# Patient Record
Sex: MALE | Race: WHITE | Employment: UNEMPLOYED | URBAN - NONMETROPOLITAN AREA
[De-identification: names, ages, dates, MRNs, and addresses within clinical notes are randomized per-mention and may not be internally consistent; named-entity substitution may affect disease eponyms.]

---

## 2017-11-14 ENCOUNTER — HOSPITAL ENCOUNTER (INPATIENT)
Age: 57
LOS: 2 days | Discharge: HOME | DRG: 885 | End: 2017-11-17
Attending: EMERGENCY MEDICINE | Admitting: PSYCHIATRY & NEUROLOGY
Payer: COMMERCIAL

## 2017-11-14 DIAGNOSIS — R45.851 DEPRESSION WITH SUICIDAL IDEATION: Primary | ICD-10-CM

## 2017-11-14 DIAGNOSIS — R79.89 ELEVATED SERUM CREATININE: ICD-10-CM

## 2017-11-14 DIAGNOSIS — T50.902A INTENTIONAL DRUG OVERDOSE, INITIAL ENCOUNTER (HCC): ICD-10-CM

## 2017-11-14 DIAGNOSIS — F10.920 ACUTE ALCOHOLIC INTOXICATION WITHOUT COMPLICATION (HCC): ICD-10-CM

## 2017-11-14 DIAGNOSIS — F32.A DEPRESSION WITH SUICIDAL IDEATION: Primary | ICD-10-CM

## 2017-11-14 LAB
ACETAMINOPHEN LEVEL: <15 UG/ML
ALBUMIN SERPL-MCNC: 4.3 G/DL (ref 3.5–5.2)
ALP BLD-CCNC: 74 U/L (ref 40–130)
ALT SERPL-CCNC: 35 U/L (ref 5–41)
AMORPHOUS: ABNORMAL /HPF
AMPHETAMINE SCREEN, URINE: NEGATIVE
ANION GAP SERPL CALCULATED.3IONS-SCNC: 16 MMOL/L (ref 7–19)
AST SERPL-CCNC: 29 U/L (ref 5–40)
BACTERIA: ABNORMAL /HPF
BARBITURATE SCREEN URINE: NEGATIVE
BASOPHILS ABSOLUTE: 0 K/UL (ref 0–0.2)
BASOPHILS RELATIVE PERCENT: 0.4 % (ref 0–1)
BENZODIAZEPINE SCREEN, URINE: NEGATIVE
BILIRUB SERPL-MCNC: <0.2 MG/DL (ref 0.2–1.2)
BILIRUBIN URINE: NEGATIVE
BLOOD, URINE: ABNORMAL
BUN BLDV-MCNC: 24 MG/DL (ref 6–20)
CALCIUM SERPL-MCNC: 8.7 MG/DL (ref 8.6–10)
CANNABINOID SCREEN URINE: NEGATIVE
CASTS: ABNORMAL /LPF
CHLORIDE BLD-SCNC: 102 MMOL/L (ref 98–111)
CLARITY: ABNORMAL
CO2: 23 MMOL/L (ref 22–29)
COCAINE METABOLITE SCREEN URINE: NEGATIVE
COLOR: YELLOW
CREAT SERPL-MCNC: 1.5 MG/DL (ref 0.5–1.2)
EOSINOPHILS ABSOLUTE: 0 K/UL (ref 0–0.6)
EOSINOPHILS RELATIVE PERCENT: 0.3 % (ref 0–5)
EPITHELIAL CELLS, UA: ABNORMAL /HPF
ETHANOL: 156 MG/DL (ref 0–0.08)
GFR NON-AFRICAN AMERICAN: 48
GLUCOSE BLD-MCNC: 104 MG/DL (ref 74–109)
GLUCOSE URINE: NEGATIVE MG/DL
HCT VFR BLD CALC: 42.5 % (ref 42–52)
HEMOGLOBIN: 14.2 G/DL (ref 14–18)
INR BLD: 1.08 (ref 0.88–1.18)
KETONES, URINE: NEGATIVE MG/DL
LEUKOCYTE ESTERASE, URINE: NEGATIVE
LYMPHOCYTES ABSOLUTE: 1.5 K/UL (ref 1.1–4.5)
LYMPHOCYTES RELATIVE PERCENT: 19.1 % (ref 20–40)
Lab: NORMAL
MCH RBC QN AUTO: 30.3 PG (ref 27–31)
MCHC RBC AUTO-ENTMCNC: 33.4 G/DL (ref 33–37)
MCV RBC AUTO: 90.6 FL (ref 80–94)
MONOCYTES ABSOLUTE: 0.5 K/UL (ref 0–0.9)
MONOCYTES RELATIVE PERCENT: 6.7 % (ref 0–10)
NEUTROPHILS ABSOLUTE: 5.8 K/UL (ref 1.5–7.5)
NEUTROPHILS RELATIVE PERCENT: 73 % (ref 50–65)
NITRITE, URINE: NEGATIVE
OPIATE SCREEN URINE: NEGATIVE
PDW BLD-RTO: 11.9 % (ref 11.5–14.5)
PH UA: 6
PLATELET # BLD: 260 K/UL (ref 130–400)
PMV BLD AUTO: 8.9 FL (ref 9.4–12.4)
POTASSIUM SERPL-SCNC: 3.9 MMOL/L (ref 3.5–5)
PROTEIN UA: 100 MG/DL
PROTHROMBIN TIME: 13.9 SEC (ref 12–14.6)
RBC # BLD: 4.69 M/UL (ref 4.7–6.1)
SALICYLATE, SERUM: <3 MG/DL (ref 3–10)
SODIUM BLD-SCNC: 141 MMOL/L (ref 136–145)
SPECIFIC GRAVITY UA: 1.02
TOTAL PROTEIN: 7.4 G/DL (ref 6.6–8.7)
UROBILINOGEN, URINE: 0.2 E.U./DL
WBC # BLD: 7.9 K/UL (ref 4.8–10.8)
WBC UA: ABNORMAL /HPF (ref 0–5)

## 2017-11-14 PROCEDURE — 80053 COMPREHEN METABOLIC PANEL: CPT

## 2017-11-14 PROCEDURE — 85025 COMPLETE CBC W/AUTO DIFF WBC: CPT

## 2017-11-14 PROCEDURE — G0480 DRUG TEST DEF 1-7 CLASSES: HCPCS

## 2017-11-14 PROCEDURE — 36415 COLL VENOUS BLD VENIPUNCTURE: CPT

## 2017-11-14 PROCEDURE — 99285 EMERGENCY DEPT VISIT HI MDM: CPT

## 2017-11-14 PROCEDURE — 81025 URINE PREGNANCY TEST: CPT

## 2017-11-14 PROCEDURE — 93005 ELECTROCARDIOGRAM TRACING: CPT

## 2017-11-14 PROCEDURE — 80307 DRUG TEST PRSMV CHEM ANLYZR: CPT

## 2017-11-14 PROCEDURE — 85610 PROTHROMBIN TIME: CPT

## 2017-11-14 PROCEDURE — 81003 URINALYSIS AUTO W/O SCOPE: CPT

## 2017-11-14 PROCEDURE — 2580000003 HC RX 258: Performed by: EMERGENCY MEDICINE

## 2017-11-14 RX ORDER — BUPROPION HYDROCHLORIDE 150 MG/1
150 TABLET ORAL EVERY MORNING
COMMUNITY

## 2017-11-14 RX ORDER — NICOTINE 21 MG/24HR
1 PATCH, TRANSDERMAL 24 HOURS TRANSDERMAL DAILY
Status: DISCONTINUED | OUTPATIENT
Start: 2017-11-15 | End: 2017-11-15

## 2017-11-14 RX ORDER — SIMVASTATIN 40 MG
40 TABLET ORAL NIGHTLY
COMMUNITY

## 2017-11-14 RX ORDER — GEMFIBROZIL 600 MG/1
600 TABLET, FILM COATED ORAL 2 TIMES DAILY
COMMUNITY

## 2017-11-14 RX ORDER — AMLODIPINE BESYLATE AND BENAZEPRIL HYDROCHLORIDE 5; 10 MG/1; MG/1
1 CAPSULE ORAL DAILY
COMMUNITY

## 2017-11-14 RX ORDER — FLUOXETINE HYDROCHLORIDE 20 MG/1
40 CAPSULE ORAL DAILY
Status: ON HOLD | COMMUNITY
End: 2017-11-17 | Stop reason: HOSPADM

## 2017-11-14 RX ORDER — SODIUM CHLORIDE 9 MG/ML
INJECTION, SOLUTION INTRAVENOUS CONTINUOUS
Status: DISCONTINUED | OUTPATIENT
Start: 2017-11-14 | End: 2017-11-15

## 2017-11-14 RX ADMIN — SODIUM CHLORIDE: 9 INJECTION, SOLUTION INTRAVENOUS at 19:06

## 2017-11-14 ASSESSMENT — ENCOUNTER SYMPTOMS
ABDOMINAL PAIN: 0
SHORTNESS OF BREATH: 0
COUGH: 0
NAUSEA: 0
WHEEZING: 0
CONSTIPATION: 0
BLOOD IN STOOL: 0
CHEST TIGHTNESS: 0
VOMITING: 0
EYES NEGATIVE: 1
DIARRHEA: 0
RECTAL PAIN: 0

## 2017-11-15 PROBLEM — F32.A DEPRESSION WITH SUICIDAL IDEATION: Status: ACTIVE | Noted: 2017-11-15

## 2017-11-15 PROBLEM — N28.9 ACUTE RENAL IMPAIRMENT: Status: ACTIVE | Noted: 2017-11-15

## 2017-11-15 PROBLEM — F32.A DEPRESSION WITH SUICIDAL IDEATION: Status: RESOLVED | Noted: 2017-11-15 | Resolved: 2017-11-15

## 2017-11-15 PROBLEM — F17.223: Status: ACTIVE | Noted: 2017-11-15

## 2017-11-15 PROBLEM — F10.10 ALCOHOL ABUSE: Status: ACTIVE | Noted: 2017-11-15
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
## 2017-11-15 PROBLEM — F43.20 ANACLITIC DEPRESSION: Status: ACTIVE | Noted: 2017-11-15

## 2017-11-15 PROBLEM — F33.2 SEVERE EPISODE OF RECURRENT MAJOR DEPRESSIVE DISORDER, WITHOUT PSYCHOTIC FEATURES (HCC): Status: ACTIVE | Noted: 2017-11-15

## 2017-11-15 PROBLEM — I10 ESSENTIAL HYPERTENSION: Status: ACTIVE | Noted: 2017-11-15

## 2017-11-15 PROBLEM — R45.851 DEPRESSION WITH SUICIDAL IDEATION: Status: RESOLVED | Noted: 2017-11-15 | Resolved: 2017-11-15

## 2017-11-15 PROBLEM — E78.5 HYPERLIPIDEMIA: Status: ACTIVE | Noted: 2017-11-15

## 2017-11-15 PROBLEM — G47.8 SLEEP AROUSAL DISORDER: Status: ACTIVE | Noted: 2017-11-15

## 2017-11-15 PROBLEM — R45.851 DEPRESSION WITH SUICIDAL IDEATION: Status: ACTIVE | Noted: 2017-11-15

## 2017-11-15 LAB
EKG P AXIS: 59 DEGREES
EKG P-R INTERVAL: 212 MS
EKG Q-T INTERVAL: 396 MS
EKG QRS DURATION: 110 MS
EKG QTC CALCULATION (BAZETT): 443 MS
EKG T AXIS: 66 DEGREES
ETHANOL: 19 MG/DL (ref 0–0.08)

## 2017-11-15 PROCEDURE — 99284 EMERGENCY DEPT VISIT MOD MDM: CPT | Performed by: EMERGENCY MEDICINE

## 2017-11-15 PROCEDURE — 90674 CCIIV4 VAC NO PRSV 0.5 ML IM: CPT | Performed by: FAMILY MEDICINE

## 2017-11-15 PROCEDURE — G0008 ADMIN INFLUENZA VIRUS VAC: HCPCS | Performed by: FAMILY MEDICINE

## 2017-11-15 PROCEDURE — 1240000000 HC EMOTIONAL WELLNESS R&B

## 2017-11-15 PROCEDURE — 90792 PSYCH DIAG EVAL W/MED SRVCS: CPT | Performed by: PSYCHIATRY & NEUROLOGY

## 2017-11-15 PROCEDURE — 6370000000 HC RX 637 (ALT 250 FOR IP): Performed by: PSYCHIATRY & NEUROLOGY

## 2017-11-15 PROCEDURE — 6370000000 HC RX 637 (ALT 250 FOR IP): Performed by: EMERGENCY MEDICINE

## 2017-11-15 PROCEDURE — G0480 DRUG TEST DEF 1-7 CLASSES: HCPCS

## 2017-11-15 PROCEDURE — 6360000002 HC RX W HCPCS: Performed by: FAMILY MEDICINE

## 2017-11-15 PROCEDURE — 36415 COLL VENOUS BLD VENIPUNCTURE: CPT

## 2017-11-15 RX ORDER — BUPROPION HYDROCHLORIDE 150 MG/1
150 TABLET ORAL EVERY MORNING
Status: DISCONTINUED | OUTPATIENT
Start: 2017-11-15 | End: 2017-11-17 | Stop reason: HOSPADM

## 2017-11-15 RX ORDER — NICOTINE 21 MG/24HR
1 PATCH, TRANSDERMAL 24 HOURS TRANSDERMAL DAILY
Status: DISCONTINUED | OUTPATIENT
Start: 2017-11-15 | End: 2017-11-17 | Stop reason: HOSPADM

## 2017-11-15 RX ORDER — AMLODIPINE BESYLATE AND BENAZEPRIL HYDROCHLORIDE 5; 10 MG/1; MG/1
1 CAPSULE ORAL DAILY
Status: DISCONTINUED | OUTPATIENT
Start: 2017-11-15 | End: 2017-11-15

## 2017-11-15 RX ORDER — AMLODIPINE BESYLATE 5 MG/1
5 TABLET ORAL DAILY
Status: DISCONTINUED | OUTPATIENT
Start: 2017-11-15 | End: 2017-11-17 | Stop reason: HOSPADM

## 2017-11-15 RX ORDER — NALTREXONE HYDROCHLORIDE 50 MG/1
25 TABLET, FILM COATED ORAL
Status: DISCONTINUED | OUTPATIENT
Start: 2017-11-16 | End: 2017-11-15

## 2017-11-15 RX ORDER — TRAZODONE HYDROCHLORIDE 50 MG/1
50 TABLET ORAL NIGHTLY
Status: DISCONTINUED | OUTPATIENT
Start: 2017-11-15 | End: 2017-11-17 | Stop reason: HOSPADM

## 2017-11-15 RX ORDER — GEMFIBROZIL 600 MG/1
600 TABLET, FILM COATED ORAL 2 TIMES DAILY
Status: DISCONTINUED | OUTPATIENT
Start: 2017-11-15 | End: 2017-11-17 | Stop reason: HOSPADM

## 2017-11-15 RX ORDER — SIMVASTATIN 40 MG
40 TABLET ORAL NIGHTLY
Status: DISCONTINUED | OUTPATIENT
Start: 2017-11-15 | End: 2017-11-17 | Stop reason: HOSPADM

## 2017-11-15 RX ORDER — NICOTINE 21 MG/24HR
1 PATCH, TRANSDERMAL 24 HOURS TRANSDERMAL ONCE
Status: COMPLETED | OUTPATIENT
Start: 2017-11-15 | End: 2017-11-16

## 2017-11-15 RX ORDER — LISINOPRIL 10 MG/1
10 TABLET ORAL DAILY
Status: DISCONTINUED | OUTPATIENT
Start: 2017-11-15 | End: 2017-11-17 | Stop reason: HOSPADM

## 2017-11-15 RX ORDER — ACETAMINOPHEN 325 MG/1
650 TABLET ORAL EVERY 4 HOURS PRN
Status: DISCONTINUED | OUTPATIENT
Start: 2017-11-15 | End: 2017-11-17 | Stop reason: HOSPADM

## 2017-11-15 RX ADMIN — A/SINGAPORE/GP1908/2015 IVR-180 (H1N1) (AN A/MICHIGAN/45/2015-LIKE VIRUS), A/SINGAPORE/GP2050/2015 (H3N2) (AN A/HONG KONG/4801/2014 - LIKE VIRUS), B/UTAH/9/2014 (A B/PHUKET/3073/2013-LIKE VIRUS), B/HONG KONG/259/2010 (A B/BRISBANE/60/08-LIKE VIRUS) 0.5 ML: 15; 15; 15; 15 INJECTION, SUSPENSION INTRAMUSCULAR at 20:24

## 2017-11-15 RX ADMIN — ACETAMINOPHEN 650 MG: 325 TABLET, FILM COATED ORAL at 10:25

## 2017-11-15 RX ADMIN — TRAZODONE HYDROCHLORIDE 50 MG: 50 TABLET ORAL at 20:24

## 2017-11-15 RX ADMIN — LISINOPRIL 10 MG: 10 TABLET ORAL at 13:04

## 2017-11-15 RX ADMIN — SIMVASTATIN 40 MG: 40 TABLET, FILM COATED ORAL at 20:24

## 2017-11-15 RX ADMIN — GEMFIBROZIL 600 MG: 600 TABLET ORAL at 13:04

## 2017-11-15 RX ADMIN — AMLODIPINE BESYLATE 5 MG: 5 TABLET ORAL at 13:04

## 2017-11-15 RX ADMIN — BUPROPION HYDROCHLORIDE 150 MG: 150 TABLET, FILM COATED, EXTENDED RELEASE ORAL at 13:04

## 2017-11-15 RX ADMIN — GEMFIBROZIL 600 MG: 600 TABLET ORAL at 20:24

## 2017-11-15 RX ADMIN — ACETAMINOPHEN 650 MG: 325 TABLET, FILM COATED ORAL at 20:24

## 2017-11-15 ASSESSMENT — SLEEP AND FATIGUE QUESTIONNAIRES
DIFFICULTY STAYING ASLEEP: YES
SLEEP PATTERN: DIFFICULTY FALLING ASLEEP;DISTURBED/INTERRUPTED SLEEP
RESTFUL SLEEP: NO
AVERAGE NUMBER OF SLEEP HOURS: 7
DIFFICULTY FALLING ASLEEP: YES
DO YOU HAVE DIFFICULTY SLEEPING: YES
DO YOU USE A SLEEP AID: NO
DIFFICULTY ARISING: YES

## 2017-11-15 ASSESSMENT — LIFESTYLE VARIABLES: HISTORY_ALCOHOL_USE: YES

## 2017-11-15 ASSESSMENT — PAIN SCALES - GENERAL
PAINLEVEL_OUTOF10: 3
PAINLEVEL_OUTOF10: 6

## 2017-11-15 ASSESSMENT — PATIENT HEALTH QUESTIONNAIRE - PHQ9: SUM OF ALL RESPONSES TO PHQ QUESTIONS 1-9: 6

## 2017-11-15 NOTE — ED NOTES
ASSESSMENT:    PT ALERT/ORIENTED X4. PUPILS EQUAL/REACTIVE    SKIN:  WARM/DRY PINK CAPILLARY REFILL < 2SECS    CARDIAC:  S1 S2 NOTED     LUNGS: CLEAR UPPER AND LOWER LOBES, RESPIRATIONS EVEN/UNLABORED     ABDOMEN: BOWEL SOUNDS NOTED UPPER AND LOWER QUADRANTS                     SOFT AND NONTENDER. EXTREMITIES:  BILATERAL DP AND PT AND NO EDEMA NOTED. NO DISTRESS NOTED. SIDE RAILS UP AND CALL LIGHT IN REACH.      Cristian Burleson RN  11/14/17 3542

## 2017-11-15 NOTE — PROGRESS NOTES
CSW attempted to obtain collateral and discuss family meeting date/times, as discussed with Dr. Estrella Monterroso, with pt's wife at the number provided [phone]. No answer, and no option to leave a message. CSW to attempt again at later time.      Electronically signed by Sheila Rubinstein, [address] on 11/15/2017 at 12:17 PM

## 2017-11-15 NOTE — BH NOTE
BHI Daily Shift Assessment  Nursing Progress Note    Room: [facility]615-01 Name: Vincenzo Flores Age: 65 y.o. Gender: male   Dx: <principal problem not specified>  Precautions: suicide risk and fall risk  Has POA: Yes    Target Symptoms:    Accu-Chek: No  Sleep: Yes,Sleep Quality Good   SI no plan AVH denied HI Negative for homicidal ideation     Depression: 5 Anxiety: 5    Med Compliant: Yes   PRN Meds: No    Appetite: good Percent Meals: 75%   Social: No ADLs: Yes Speech: normal   Shower this Shift: No Groomed: Yes Dressed appropriately for day:  Yes  Attends Group Therapy: Yes  Participation LevelActive Listener  Participation QualityResistant    Complaints:      Notes:       Signature: Luis Manuel Dillard LPN

## 2017-11-15 NOTE — ED PROVIDER NOTES
[facility] [address]  eMERGENCY dEPARTMENT eNCOUnter      Pt Name: Donn De La Torre  MRN: [id number]  [place] 1960  Date of evaluation: 11/14/2017  Provider: JOSE Palm    CHIEF COMPLAINT       Chief Complaint   Patient presents with    Drug Overdose     40-50 OTC sleep aids, 10 percocet 10mg, possibly lisinopril, ETOH 500 ML whiskey. 10am today. Pt states it was a suicide attempt         HISTORY OF PRESENT ILLNESS  (Location/Symptom, Timing/Onset, Context/Setting, Quality, Duration, Modifying Factors, Severity.)   Donn De La Torre is a 65 y.o. male who presents to the emergency department with reports of an intentional overdose. Onset 1000 this morning. Patient reluctantly reports that he took 10-Percocet 10.5 mg pills today, approximately 100- \"over the counter generic sleep aids because they were cheaper\" and drank an undetermined amount of Malagasy mist. The patient stated \"I just figured my wife and family would be better off financially if I wasn't around anymore\". Pill count by nursing staff relays that there is 100 pills in the Rexall 25 mg diphenhydramine bottle and 25 pills were missing. HPI    Nursing Notes were reviewed and I agree. REVIEW OF SYSTEMS    (2-9 systems for level 4, 10 or more for level 5)     Review of Systems   Constitutional: Negative for chills, fatigue and fever. HENT: Negative. Eyes: Negative. Respiratory: Negative for cough, chest tightness, shortness of breath and wheezing. Cardiovascular: Negative for chest pain and palpitations. Gastrointestinal: Negative for abdominal pain, blood in stool, constipation, diarrhea, nausea, rectal pain and vomiting. Genitourinary: Negative. Musculoskeletal: Negative. Skin: Negative. Neurological: Negative. Psychiatric/Behavioral: Positive for self-injury. Intentional overdose. All other systems reviewed and are negative.       PAST MEDICAL HISTORY     Past Medical History:   Diagnosis Date    Depression     Diverticulitis     Hyperlipidemia     Hypertension          SURGICAL HISTORY       Past Surgical History:   Procedure Laterality Date    COLECTOMY           CURRENT MEDICATIONS       Current Discharge Medication List      CONTINUE these medications which have NOT CHANGED    Details   FLUoxetine (PROZAC) 20 MG capsule Take 40 mg by mouth daily      amLODIPine-benazepril (LOTREL) 5-10 MG per capsule Take 1 capsule by mouth daily      gemfibrozil (LOPID) 600 MG tablet Take 600 mg by mouth 2 times daily      buPROPion (WELLBUTRIN XL) 150 MG extended release tablet Take 150 mg by mouth every morning      simvastatin (ZOCOR) 40 MG tablet Take 40 mg by mouth nightly             ALLERGIES     Review of patient's allergies indicates no known allergies. FAMILY HISTORY     History reviewed. No pertinent family history. SOCIAL HISTORY       Social History     Social History    Marital status:      Spouse name: N/A    Number of children: N/A    Years of education: N/A     Social History Main Topics    Smoking status: Never Smoker    Smokeless tobacco: Current User     Types: Chew    Alcohol use 1.8 oz/week     3 Cans of beer per week    Drug use: No    Sexual activity: Not Asked     Other Topics Concern    None     Social History Narrative    None       SCREENINGS    [place] Coma Scale  Eye Opening: Spontaneous  Best Verbal Response: Oriented  Best Motor Response: Obeys commands  [place] Coma Scale Score: 15      PHYSICAL EXAM    (up to 7 for level 4, 8 or more for level 5)     ED Triage Vitals   BP Temp Temp Source Pulse Resp SpO2 Height Weight   11/14/17 1927 11/14/17 1852 11/14/17 1852 11/14/17 1927 11/14/17 1927 11/14/17 1927 11/14/17 1852 11/14/17 1852   108/71 97.5 °F (36.4 °C) Temporal 88 16 93 % 5' 9\" (1.753 m) 205 lb (93 kg)       Physical Exam   Constitutional: He is oriented to person, place, and time.  He appears well-developed and that the patient had actually taken 6 tablets of his Lotrel this morning also. Poison control was notified recommendations standard is the same. Dr. Art Mcdonough also updated. Emergency Room Treatment Plan:  The patient was evaluated. Case discussed with Dr. Art Mcdonough. IV established. Blood and urine specimen obtained and sent to lab for analysis. IV fluids initiated due to hypotension. EKG will be performed. Patient will be visually monitored. Patient will be placed on telemetry and pulse ox monitoring. Patient will be monitored until his EtOH level is 70 or below so that he can be evaluated by behavioral health. Dr. Art Mcdonough will be continuing care at this point. Procedures      FINAL IMPRESSION      1. Depression with suicidal ideation    2. Intentional drug overdose, initial encounter ([facility] [address]    3. Acute alcoholic intoxication without complication (HCC)    4. Elevated serum creatinine          DISPOSITION/PLAN   DISPOSITION     PATIENT REFERRED TO:  No follow-up provider specified.     DISCHARGE MEDICATIONS:  Current Discharge Medication List          (Please note that portions of this note were completed with a voice recognition program.  Efforts were made to edit the dictations but occasionally words are mis-transcribed.)    Gennaro Ganser, [facility], APRN  11/15/17 6337

## 2017-11-15 NOTE — ED PROVIDER NOTES
Attending Supervisory Note/Shared Visit   I have personally performed a face to face diagnostic evaluation on this patient. I have reviewed the mid-levels findings and agree. Briefly, patient is a 32-year-old male who lost his job yesterday because he failed a drug test. Very depressed about this and overdosed at 9 AM this morning. Took 10 Percocet, 40-50 over-the-counter Benadryl, drank alcohol and took 6 of his blood pressure pills. He said he feels like his family would be better off financially if he was not around. Patient was intoxicated and slightly drowsy on arrival. Case was discussed with poison control. Patient was observed in the department until he was sober. His symptoms have resolved and is back to baseline now. His blood pressure was a little bit low but he was given a fluid bolus and his blood pressure has remained stable for several hours. Creatinine is slightly elevated. Notified patient and his wife about this. Told him that he should follow up with his primary doctor after he is discharged for recheck. Case discussed with on-call psychiatrist, Dr. Darell Nyhan, who is agreeable with plan of care and admission. FINAL IMPRESSION      1. Depression with suicidal ideation    2. Intentional drug overdose, initial encounter ([facility] [address]    3.  Acute alcoholic intoxication without complication (HCC)    4. Elevated serum creatinine          Maria L Gonzalez MD  Attending Emergency Physician      Maria L Gonzalez MD  11/15/17 5846

## 2017-11-15 NOTE — ED NOTES
Patient placed on cardiac monitor, continuous pulse oximeter, and NIBP monitor. Monitor alarms on.    [address], RN  11/14/17 6575

## 2017-11-15 NOTE — ED NOTES
Bed: 09  Expected date: 11/14/17  Expected time:   Means of arrival: [facility]  Comments:  Sabina Cruz RN  11/14/17 8806

## 2017-11-15 NOTE — ED TRIAGE NOTES
Pt arrived by EMS for intentional overdose on 40-50 OTC sleep aids (diphenhydramine hydrochloride) and 10 x 10mg Percocet. Pt states he may have taken lisinopril too.   Pt states he wanted to kill himself because he \"lost his job yesterday\"

## 2017-11-15 NOTE — H&P
Initial Psychiatric Evaluation    I. Patient Name:  Kellen Hernandes       YOB: 1960  Med Rec No:  [id number]  Account No:  [de-identified]  Physician:  Marce Lyn  Admission Date:  11/14/2017  6:51 PM  I.D.  66 yo male  Referral: Dylon Anna in after wife called ambulance  Legal Status: Voluntary    II. Chief Complaint   Patient presents with    Drug Overdose     40-50 OTC sleep aids, 10 percocet 10mg, possibly lisinopril, ETOH 500 ML whiskey. 10am today. Pt states it was a suicide attempt     \"Well, I wasn't too responsive. I lost my job and then I started drinking. Got this bright idea that I would just take these pills and she would be OK with the money. \"  States he never felt suicidal before but had been drinking about a pint. Took OTC sleep aids and 10 hydrocodone. III.  HPI:  Mood:  depressed, irritable and sad  Vegetative Symptoms:  Sleep up frequently at night, Energy Tired/Fatigued, Appetite Normal  Thinking:  normal  Substance Use:   reports that he has never smoked. His smokeless tobacco use includes Chew. He reports that he drinks about 1.8 oz of alcohol per week . He reports that he does not use drugs. Never any illicit substance use. And has had opioids only for surgeries and none recently. No steroids. SI/HI/Aggression:  No/No/no  Current Treatment:  PCP first put him on Toprol and then on Prozac for 8-10 yrs and stopped working really after first year. Wellbutrin helped and has been on it for past year. No other medication trials. Denies hx of agustina. Loves fall and winter and hates summer. Previous Treatment: No hospitalizations. No previous suicide attempts    IV. PMH:    No primary care provider on file.   Allergies as of 11/14/2017    (No Known Allergies)     Past Medical History:   Diagnosis Date    Depression     Diverticulitis     Hyperlipidemia     Hypertension      Past Surgical History:   Procedure Laterality Date    COLECTOMY             Current Facility-Administered Medications:     nicotine (NICODERM CQ) 21 MG/24HR 1 patch, 1 patch, Transdermal, Once, Clementine Moss MD, 1 patch at 11/15/17 0023    [START ON 2017] influenza quadrivalent subunit vaccine (FLUCELVAX) injection 0.5 mL, 0.5 mL, Intramuscular, Once, Rivera Alvares MD    acetaminophen (TYLENOL) tablet 650 mg, 650 mg, Oral, Q4H PRN, Rivera Alvaers MD    nicotine (NICODERM CQ) 21 MG/24HR 1 patch, 1 patch, Transdermal, Daily, Rivera Alvares MD, 1 patch at 11/15/17 0837  ROS: disruption of sleep, depression and excessive alcohol consumption    V. Social/Family History   reports that he has never smoked. His smokeless tobacco use includes Chew. He reports that he drinks about 1.8 oz of alcohol per week . He reports that he does not use drugs. History reviewed. No pertinent family history. Let go at job after 30 years because they were cutting back and had no probs on job. Work[place] in [employer].  Father  of cerebral hemorrhage at 40 when patient was 6. Only child and had stepfather who was good to patient and  25 mons ago of cancer. Close to mother. Honor roll and 3rd in class.  34 years and good and 3 children, daughter 34 and twins 32. No legal ever. Raised [employer] but not as active since children grown. [place] and fish. Twin daughter is autistic, but is doing well and living with maternal grandmother. No other family hx of psych. Substance use: no serious alcohol or drug issues. VI. Mental Status:  LOC Alert, Oriented x4 and Attentive, Grooming disheveled and sick looking, Cooperation full, Motor slightly restless but no involuntary movements Gait Steady Mood negative toward self \"I was stupid. \" speech fluent. Thoughts adequately organized and not bizarre and some insight. No overt hallucinations or delusions. Vocabulary and fund of info indicate at least average intellect. No evidence of psychosis or organicity. Oriented fully.  Memory good in all spheres as compared with known patient historical data. Mild evidence of ADHD but not usually impulsive and judgement has not typically been dangerous in past.    VII. 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 Treatment Plan:  1.  UR Justification  Very serious overdose with suicidal intent superimposed on long-standing depression and possible alcohol use disorder and sleep disorder  2. Risk Management/Collateral  Agrees to conference with wife  3. Medication  Continue Wellbutrin and add Trazodone. Have offered campral and recommended alcohol cessation. 4.  Other Therapies Marital conference and possibly involve children  5. [address] [place] [address]  Internal Medicine for initial physical and daily medical monitoring  6.   Referrals  Vocational Rehabilitation and possible sleep study      Electronically signed by Ila Bishop MD on 11/15/17 at 9:34 AM

## 2017-11-15 NOTE — PROGRESS NOTES
Psychosocial and CSSR-S completed on this date. Pt long and short term goals discussed. Pt voiced understanding. Treatment sheet signed. It was identified that pt will require outpatient follow up appointments at local community behavioral health facility such as[address]. Pt validated need for appointments.      In the last 6 months has the pt been danger to self: YES  In the last 6 months has the pt been danger to others: NO     Provided pt with [employer] handout entitled \"Quitting Smoking,\" reviewed handout with pt addressing dangers of smoking, developing coping skills, and providing basic information about quitting.   Pt declined practical counseling of tobacco practical counseling during the hospital stay

## 2017-11-15 NOTE — ED NOTES
Report received and care transferred from [facility]  11/14/17 6301

## 2017-11-15 NOTE — PROGRESS NOTES
[facility]  Admission Note     Admission Type:   Admission Type: Voluntary    Reason for admission:  Reason for Admission: Suicide attempt    PATIENT STRENGTHS:  Strengths: Communication, No significant Physical Illness, Positive Support    Patient Strengths and Limitations:  Strengths: Independent in basic self-care activities, Demonstrates basic social skills, Positive support network  Limitations: Inappropriate/potentially harmful leisure interests, Hopeless about future    Addictive Behavior:   Addictive Behavior  Do you have a history of Chemical Use?: No  Do you have a history of Alcohol Use?: Yes  Do you have a history of Street Drug Abuse?: No  Histroy of Prescripton Drug Abuse?: No    Medical Problems:   Past Medical History:   Diagnosis Date    Depression     Diverticulitis     Hyperlipidemia     Hypertension        Status EXAM:  Status and Exam  Normal: Yes  Facial Expression: Avoids Gaze  Affect: Normal  Level of Consciousness: Alert  Mood:Normal: No  Mood: Anxious, Irritable  Motor Activity:Normal: Yes  Interview Behavior: Cooperative  Preception: [place] to Person, [place] to Time, [place] to Place, [place] to Situation  Attention:Normal: Yes  Thought Processes: Circumstantial  Thought Content:Normal: Yes  Hallucinations: None  Delusions: No  Memory:Normal: Yes  Insight and Judgment: No  Insight and Judgment: Poor Judgment, Poor Insight  Present Suicidal Ideation: No  Present Homicidal Ideation: No    Pt admitted with followings belongings:  Dentures: None  Vision - Corrective Lenses: Glasses  Hearing Aid: None  Jewelry: None  Body Piercings Removed: No  Clothing: None  Were All Patient Medications Collected?: Not Applicable     Pt arrived to unit via wheelchair accompanied by security and [employer] tech. P searched for contraband by RN X 2.  Valuables sent home with wife. Patient oriented to surroundings and program expectations and copy of patient rights given.  Received admission

## 2017-11-15 NOTE — ED NOTES
Contacted Poison Control, spoke with Julee Harmon. Chapin Desir states therapeutic measures, check tylenol and ASA level, check urine. Could notice some agitation, urinary retention, resp depression.       Kaylan Griffith RN  11/14/17 1910

## 2017-11-16 LAB
ANION GAP SERPL CALCULATED.3IONS-SCNC: 14 MMOL/L (ref 7–19)
BUN BLDV-MCNC: 14 MG/DL (ref 6–20)
CALCIUM SERPL-MCNC: 9.5 MG/DL (ref 8.6–10)
CHLORIDE BLD-SCNC: 103 MMOL/L (ref 98–111)
CO2: 25 MMOL/L (ref 22–29)
CREAT SERPL-MCNC: 0.7 MG/DL (ref 0.5–1.2)
GFR NON-AFRICAN AMERICAN: >60
GLUCOSE BLD-MCNC: 106 MG/DL (ref 74–109)
POTASSIUM SERPL-SCNC: 4 MMOL/L (ref 3.5–5)
SODIUM BLD-SCNC: 142 MMOL/L (ref 136–145)
TSH SERPL DL<=0.05 MIU/L-ACNC: 1.68 UIU/ML (ref 0.27–4.2)
VITAMIN B-12: 380 PG/ML (ref 211–946)
VITAMIN D 25-HYDROXY: 23.8 NG/ML

## 2017-11-16 PROCEDURE — 84443 ASSAY THYROID STIM HORMONE: CPT

## 2017-11-16 PROCEDURE — 82607 VITAMIN B-12: CPT

## 2017-11-16 PROCEDURE — 82306 VITAMIN D 25 HYDROXY: CPT

## 2017-11-16 PROCEDURE — 6370000000 HC RX 637 (ALT 250 FOR IP): Performed by: FAMILY MEDICINE

## 2017-11-16 PROCEDURE — 6370000000 HC RX 637 (ALT 250 FOR IP): Performed by: PSYCHIATRY & NEUROLOGY

## 2017-11-16 PROCEDURE — 80048 BASIC METABOLIC PNL TOTAL CA: CPT

## 2017-11-16 PROCEDURE — 1240000000 HC EMOTIONAL WELLNESS R&B

## 2017-11-16 PROCEDURE — 36415 COLL VENOUS BLD VENIPUNCTURE: CPT

## 2017-11-16 RX ORDER — CHOLECALCIFEROL (VITAMIN D3) 125 MCG
500 CAPSULE ORAL DAILY
Status: DISCONTINUED | OUTPATIENT
Start: 2017-11-16 | End: 2017-11-17 | Stop reason: HOSPADM

## 2017-11-16 RX ORDER — ERGOCALCIFEROL 1.25 MG/1
50000 CAPSULE ORAL WEEKLY
Status: DISCONTINUED | OUTPATIENT
Start: 2017-11-16 | End: 2017-11-17 | Stop reason: HOSPADM

## 2017-11-16 RX ORDER — ERGOCALCIFEROL (VITAMIN D2) 1250 MCG
50000 CAPSULE ORAL WEEKLY
Qty: 11 CAPSULE | Refills: 0 | Status: SHIPPED | OUTPATIENT
Start: 2017-11-16 | End: 2018-01-26

## 2017-11-16 RX ADMIN — GEMFIBROZIL 600 MG: 600 TABLET ORAL at 08:43

## 2017-11-16 RX ADMIN — TRAZODONE HYDROCHLORIDE 50 MG: 50 TABLET ORAL at 20:12

## 2017-11-16 RX ADMIN — SIMVASTATIN 40 MG: 40 TABLET, FILM COATED ORAL at 20:12

## 2017-11-16 RX ADMIN — BUPROPION HYDROCHLORIDE 150 MG: 150 TABLET, FILM COATED, EXTENDED RELEASE ORAL at 08:43

## 2017-11-16 RX ADMIN — GEMFIBROZIL 600 MG: 600 TABLET ORAL at 20:12

## 2017-11-16 RX ADMIN — LISINOPRIL 10 MG: 10 TABLET ORAL at 08:44

## 2017-11-16 RX ADMIN — CYANOCOBALAMIN TAB 500 MCG 500 MCG: 500 TAB at 12:21

## 2017-11-16 RX ADMIN — AMLODIPINE BESYLATE 5 MG: 5 TABLET ORAL at 08:44

## 2017-11-16 RX ADMIN — ERGOCALCIFEROL 50000 UNITS: 1.25 CAPSULE ORAL at 12:21

## 2017-11-16 NOTE — FLOWSHEET NOTE
Shift Assessment Interview:      11/15/17 2131   Status and Exam   Normal Yes   Facial Expression Brightened   Affect Appropriate   Level of Consciousness Alert   Mood:Normal Yes   Motor Activity:Normal Yes   Preception [place] to Person;[place] to Time;[place] to Place;[place] to Situation   Attention:Normal Yes   Thought Content:Normal Yes   Hallucinations None   Delusions No   Memory:Normal Yes   Insight and Judgment No   Insight and Judgment Unrealistic;Poor Insight;Poor Judgment   Present Suicidal Ideation No   Present Homicidal Ideation No       Pt was unusually content during assessment interview. When asked to rate depression and anxiety, he just responded, \"low. .. Not much. \" He says that he don't need anything. \"What you see is what you get. \" Pt seemed to be avoiding the topic. His input was not very introspective at this time.   Electronically signed by Jennifer Rodriguez RN on 11/15/2017 at 9:42 PM

## 2017-11-16 NOTE — H&P
alert  HEENT: WNL  Lymph: no LAD  Neck: no JVD or masses  Chest: CTA bilat  CV: RRR  Abdomen: NT/ND  Extrem: no C/C/E  Neuro: non focal  Skin: no rashes  Joints: no redness    DATA:  I have reviewed the admission labs and imaging tests.     ASSESSMENT AND PLAN:      Patient Active Hospital Problem List:   Severe episode of recurrent major depressive disorder, without psychotic features---follow with Psych   Alcohol abuse----monitor for withdrawal   Essential hypertension---follow with BP   Acute renal impairment--monitor renal function   Hyperlipidemia---continue treatment                  Yamile Mathews MD  11:27 PM 11/15/2017

## 2017-11-16 NOTE — PROGRESS NOTES
CSW spoke with pt about the IOP program. CSW explained the program and what pt could expect. Pt asked some questions and CSW answered them to pt's satisfaction. Pt reported he was interested in the program and CSW stated appointments would be arranged prior to discharge.

## 2017-11-17 VITALS
BODY MASS INDEX: 31.1 KG/M2 | WEIGHT: 210 LBS | HEIGHT: 69 IN | HEART RATE: 107 BPM | DIASTOLIC BLOOD PRESSURE: 89 MMHG | TEMPERATURE: 97.3 F | SYSTOLIC BLOOD PRESSURE: 124 MMHG | RESPIRATION RATE: 20 BRPM | OXYGEN SATURATION: 93 %

## 2017-11-17 PROBLEM — F11.20 OPIOID DEPENDENCE (HCC): Status: ACTIVE | Noted: 2017-11-17

## 2017-11-17 PROBLEM — K57.92 DIVERTICULITIS: Status: ACTIVE | Noted: 2017-11-17

## 2017-11-17 LAB
AMPHETAMINE SCREEN, URINE: NEGATIVE
BARBITURATE SCREEN URINE: NEGATIVE
BENZODIAZEPINE SCREEN, URINE: NEGATIVE
CANNABINOID SCREEN URINE: NEGATIVE
COCAINE METABOLITE SCREEN URINE: NEGATIVE
Lab: NORMAL
OPIATE SCREEN URINE: NEGATIVE

## 2017-11-17 PROCEDURE — 5130000000 HC BRIDGE APPOINTMENT

## 2017-11-17 PROCEDURE — 6370000000 HC RX 637 (ALT 250 FOR IP): Performed by: FAMILY MEDICINE

## 2017-11-17 PROCEDURE — 80307 DRUG TEST PRSMV CHEM ANLYZR: CPT

## 2017-11-17 PROCEDURE — 6370000000 HC RX 637 (ALT 250 FOR IP): Performed by: PSYCHIATRY & NEUROLOGY

## 2017-11-17 PROCEDURE — 99239 HOSP IP/OBS DSCHRG MGMT >30: CPT | Performed by: PSYCHIATRY & NEUROLOGY

## 2017-11-17 RX ORDER — NALTREXONE HYDROCHLORIDE 50 MG/1
50 TABLET, FILM COATED ORAL
Qty: 30 TABLET | Refills: 0 | Status: SHIPPED | OUTPATIENT
Start: 2017-11-18

## 2017-11-17 RX ORDER — NALTREXONE HYDROCHLORIDE 50 MG/1
50 TABLET, FILM COATED ORAL
Status: DISCONTINUED | OUTPATIENT
Start: 2017-11-18 | End: 2017-11-17 | Stop reason: HOSPADM

## 2017-11-17 RX ORDER — NICOTINE 21 MG/24HR
1 PATCH, TRANSDERMAL 24 HOURS TRANSDERMAL DAILY
Qty: 30 PATCH | Refills: 0 | Status: SHIPPED | OUTPATIENT
Start: 2017-11-18

## 2017-11-17 RX ORDER — TRAZODONE HYDROCHLORIDE 50 MG/1
50 TABLET ORAL NIGHTLY
Qty: 30 TABLET | Refills: 0 | Status: SHIPPED | OUTPATIENT
Start: 2017-11-17

## 2017-11-17 RX ADMIN — GEMFIBROZIL 600 MG: 600 TABLET ORAL at 08:14

## 2017-11-17 RX ADMIN — BUPROPION HYDROCHLORIDE 150 MG: 150 TABLET, FILM COATED, EXTENDED RELEASE ORAL at 08:14

## 2017-11-17 RX ADMIN — AMLODIPINE BESYLATE 5 MG: 5 TABLET ORAL at 08:14

## 2017-11-17 RX ADMIN — LISINOPRIL 10 MG: 10 TABLET ORAL at 08:14

## 2017-11-17 RX ADMIN — CYANOCOBALAMIN TAB 500 MCG 500 MCG: 500 TAB at 08:14

## 2017-11-17 NOTE — PLAN OF CARE
Problem: Altered Mood, Depressive Behavior  Goal: LTG-Able to verbalize acceptance of life and situations over which he or she has no control  Outcome: Ongoing    Goal: LTG-Able to verbalize and/or display a decrease in depressive symptoms  Outcome: Ongoing    Goal: STG-Able to verbalize suicidal ideations  Outcome: Ongoing    Goal: STG-Able to verbalize support system  Outcome: Ongoing    Goal: LTG-Absence of self-harm  Outcome: Ongoing    Goal: STG-Knowledge of positive coping patterns  Outcome: Ongoing    Goal: Patient Specific Goal  Outcome: Ongoing    Goal: Participation in care planning  Outcome: Ongoing      Problem: Suicide risk  Goal: Provide patient with safe environment  Provide patient with safe environment   Outcome: Ongoing      Problem: Health Behavior:  Goal: Ability to verbalize adaptive coping strategies to use when suicidal feelings occur will improve  Ability to verbalize adaptive coping strategies to use when suicidal feelings occur will improve   Outcome: Ongoing    Goal: Ability to verbalize adaptive coping strategies to use when the urge to self-mutilate occurs will improve  Ability to verbalize adaptive coping strategies to use when the urge to self-mutilate occurs will improve   Outcome: Ongoing      Problem: Safety:  Goal: Ability to contract for his/her safety will improve  Ability to contract for his/her safety will improve   Outcome: Ongoing      Problem: Falls - Risk of:  Goal: Will remain free from falls  Will remain free from falls   Outcome: Ongoing    Goal: Absence of physical injury  Absence of physical injury   Outcome: Ongoing
Problem: Altered Mood, Depressive Behavior  Goal: STG-Able to verbalize support system  Outcome: Ongoing    Goal: LTG-Absence of self-harm  Outcome: Ongoing    Goal: Participation in care planning  Outcome: Ongoing      Problem: Suicide risk  Goal: Provide patient with safe environment  Provide patient with safe environment   Outcome: Ongoing      Problem: Health Behavior:  Goal: Ability to verbalize adaptive coping strategies to use when suicidal feelings occur will improve  Ability to verbalize adaptive coping strategies to use when suicidal feelings occur will improve   Outcome: Ongoing    Goal: Ability to verbalize adaptive coping strategies to use when the urge to self-mutilate occurs will improve  Ability to verbalize adaptive coping strategies to use when the urge to self-mutilate occurs will improve   Outcome: Ongoing      Problem: Falls - Risk of:  Goal: Will remain free from falls  Will remain free from falls   Outcome: Ongoing    Goal: Absence of physical injury  Absence of physical injury   Outcome: Ongoing
Problem: Altered Mood, Depressive Behavior  Goal: STG-Knowledge of positive coping patterns  Outcome: Ongoing                                                                      Group Therapy Note    Date: 11/15/2017  Start Time: 1430  End Time:  4291  Number of Participants: 3    Type of Group: Psychoeducation    Wellness Binder Information  Module Name:  Stress  Session Number:  3    Patient's Goal:  Preventing stress    Notes:  Pt attended group as scheduled. Pt participated by identifying stressful situations in life. Pt participated in group discussion exploring stress management techniques such as: talking with someone, communicate feelings openly, and meditation. Status After Intervention:  Improved    Participation Level:  Active Listener    Participation Quality: Attentive      Speech:  normal      Thought Process/Content: Logical      Affective Functioning: Congruent      Mood: depressed      Level of consciousness:  Attentive      Response to Learning: Able to verbalize current knowledge/experience and Able to verbalize/acknowledge new learning      Endings: None Reported    Modes of Intervention: Education and Support      Discipline Responsible: /Counselor      Signature:  Nicol Garcia
Problem: Altered Mood, Depressive Behavior  Goal: STG-Knowledge of positive coping patterns  Outcome: Ongoing                                                                      Group Therapy Note    Date: 11/16/2017  Start Time: 1000  End Time:  1100  Number of Participants: 5    Type of Group: Psychoeducation    Wellness Binder Information  Module Name:  Emotional Wellness  Session Number:  5    Patient's Goal:  To raise awareness of effective coping techniques for guilt    Notes:  Pt participated in group discussion, identified effective coping techniques for guilt.     Status After Intervention:  Improved    Participation Level: Interactive    Participation Quality: Attentive      Speech:  normal      Thought Process/Content: Logical      Affective Functioning: Congruent      Mood: anxious and depressed      Level of consciousness:  Alert and Oriented x4      Response to Learning: Able to verbalize current knowledge/experience, Able to verbalize/acknowledge new learning and Progressing to goal      Endings: None Reported    Modes of Intervention: Education      Discipline Responsible: Psychoeducational Specialist      Signature:  Faustino Suazo
Problem: Altered Mood, Depressive Behavior  Goal: STG-Knowledge of positive coping patterns  Outcome: Ongoing                                                                      Group Therapy Note    Date: 11/16/2017  Start Time: 1100  End Time:  1200  Number of Participants: 5    Type of Group: Cognitive Skills    Wellness Binder Information  Module Name:  Emotional Wellness  Session Number:  1    Patient's Goal:  To raise awareness of healthy ways to express emotions    Notes:  Pt participated in group discussion, identified healthy ways to express emotions.     Status After Intervention:  Improved    Participation Level: Interactive    Participation Quality: Attentive      Speech:  normal      Thought Process/Content: Logical      Affective Functioning: Congruent      Mood: anxious and depressed      Level of consciousness:  Alert and Oriented x4      Response to Learning: Able to verbalize current knowledge/experience, Able to verbalize/acknowledge new learning and Progressing to goal      Endings: None Reported    Modes of Intervention: Education      Discipline Responsible: Psychoeducational Specialist      Signature:  Beto Strong
Problem: Altered Mood, Depressive Behavior  Goal: STG-Knowledge of positive coping patterns  Outcome: Ongoing                                                                      Group Therapy Note    Date: 11/17/2017  Start Time: 1000  End Time:  1100  Number of Participants: 5    Type of Group: Psychoeducation    Wellness Binder Information  Module Name:  Fun & Achievement  Session Number:  2    Patient's Goal:  To raise awareness of the benefits of increasing activity level    Notes:  Pt participated in group discussion, identified benefits of increasing activity level.     Status After Intervention:  Improved    Participation Level: Interactive    Participation Quality: Attentive      Speech:  normal      Thought Process/Content: Logical      Affective Functioning: Congruent      Mood: anxious and depressed      Level of consciousness:  Alert and Oriented x4      Response to Learning: Able to verbalize current knowledge/experience, Able to verbalize/acknowledge new learning and Progressing to goal      Endings: None Reported    Modes of Intervention: Education      Discipline Responsible: Psychoeducational Specialist      Signature:  Ramez Saucedo
Problem: Altered Mood, Depressive Behavior  Intervention: Nutritional intake assessment  Pt has reported that his appetite has been WNL. Intervention: Medication intake supervison-behavioral health  He has been med compliant. Goal: LTG-Able to verbalize acceptance of life and situations over which he or she has no control  Outcome: Met This Shift  Pt reports that finding a job will assist him in overcoming his situation. Pt was educated as to the need to establish coping skills that will be sufficient even when stressors come. Pt recalls that hunting, fishing, and reading books outside are his \"go-tos. \"   Goal: LTG-Able to verbalize and/or display a decrease in depressive symptoms  Outcome: Met This Shift  Pt rates depression a \"0.\"  Goal: STG-Able to verbalize suicidal ideations  Outcome: Met This Shift  Pt denies SI.    Goal: STG-Able to verbalize support system  Outcome: Ongoing    Goal: LTG-Absence of self-harm  Outcome: Met This Shift    Goal: STG-Knowledge of positive coping patterns  Outcome: Met This Shift  Pt has ID these coping skills as having been effective in the past.   Goal: Patient Specific Goal  Outcome: Ongoing    Goal: Participation in care planning  Outcome: Met This Shift      Problem: Suicide risk  Goal: Provide patient with safe environment  Provide patient with safe environment   Outcome: Met This Shift      Problem: Health Behavior:  Goal: Ability to verbalize adaptive coping strategies to use when suicidal feelings occur will improve  Ability to verbalize adaptive coping strategies to use when suicidal feelings occur will improve   Outcome: Met This Shift    Goal: Ability to verbalize adaptive coping strategies to use when the urge to self-mutilate occurs will improve  Ability to verbalize adaptive coping strategies to use when the urge to self-mutilate occurs will improve   Outcome: Completed Date Met: 11/16/17
improve   Outcome: Met This Shift  Pt denies desire to harm himself.

## 2017-11-17 NOTE — BH NOTE
[facility]  Discharge Note    Pt discharged with followings belongings:   Dentures: None  Vision - Corrective Lenses: Glasses  Hearing Aid: None  Jewelry: None  Body Piercings Removed: No  Clothing: None  Were All Patient Medications Collected?: Not Applicable   Valuables sent home with  patient. No valuable in safe. Patient left department with Departure Mode: By self via Mobility at Departure: Ambulatory, discharged to Discharged to: Private Residence. Patient education on aftercare instructions:  Patient verbalize understanding of AVS:     Status EXAM upon discharge:  Status and Exam  Normal: Yes  Facial Expression: Brightened  Affect: Appropriate  Level of Consciousness: Alert  Mood:Normal: Yes  Mood: Anxious  Motor Activity:Normal: Yes  Interview Behavior: Cooperative  Preception: [place] to Person, Antonetta Shave to Time, [place] to Place, [place] to Situation  Attention:Normal: Yes  Thought Processes: Circumstantial  Thought Content:Normal: No  Thought Content: Preoccupations  Hallucinations: None  Delusions: No  Memory:Normal: Yes  Memory: Poor Recent  Insight and Judgment: No  Insight and Judgment: Poor Judgment, Poor Insight  Present Suicidal Ideation: No  Present Homicidal Ideation: No    Otto Bernal RN      Patient belongings and valuables inventoried with patient and staff. Scripts, medications, and appointments  reviewed with verbal readback understanding by patient. Patient denies SI, HI and AVH at this time. Bridge Appointment completed: Reviewed Discharge Instructions with patient. Patient verbalizes understanding and agreement with the discharge plan using the teachback method.    Scripts given to patient  Latest UDS included on discharge summary    Referral for Outpatient Tobacco Cessation Counseling, upon discharge (kevin X if applicable and completed):    ( )  Hospital staff assisted patient to call Quit Line or faxed referral                                   during hospitalization ( )  Recognizing danger situations (included triggers and roadblocks), if not completed on admission                    ( )  Coping skills (new ways to manage stress, exercise, relaxation techniques, changing routine, distraction), if not completed on admission                                                           ( )  Basic information about quitting (benefits of quitting, techniques in how to quit, available resources, if not completed on admission  ( ) Referral for counseling faxed to [facility]   ( ) Patient refused referral  (  xx) Patient refused counseling  ( ) Patient refused smoking cessation medication upon discharge    Vaccinations (kevin X if applicable and completed):  ( ) Patient states already received influenza vaccine elsewhere  (xx ) Patient received influenza vaccine during this hospitalization  COPY GIVEN TO PATIENT  ( ) Patient refused influenza vaccine at this time    Patient denies SI, HI AVH at present time. Patient is not delusional, psychotic or paranoid.

## 2017-11-18 NOTE — PROGRESS NOTES
Progress Note  Veronica Zamudio  11/17/2017 10:19 PM  Subjective:   Admit Date:   11/14/2017      CC/ADMIT DX:       Interval History:   Reviewed overnight events and nursing notes. He has no medical concerns. I have reviewed all labs/diagnostics from the last 24hrs. ROS:   I have done a 10 point ROS and all are negative, except what is mentioned in the HPI. Medications:             Objective:   Vitals: /89   Pulse 107   Temp 97.3 °F (36.3 °C) (Temporal)   Resp 20   Ht 5' 9\" (1.753 m)   Wt 210 lb (95.3 kg)   SpO2 93%   BMI 31.01 kg/m²  No intake or output data in the 24 hours ending 11/17/17 1309  General appearance: alert and cooperative with exam  Lungs: clear to auscultation bilaterally  Heart: RRR  Abdomen: soft, non-tender; bowel sounds normal; no masses,  no organomegaly  Extremities: extremities normal, atraumatic, no cyanosis or edema  Neurologic:  No obvious focal neurologic deficits. Assessment and Plan:   Principal Problem:    Severe episode of recurrent major depressive disorder, without psychotic features ([address]  Active Problems:    Anaclitic depression    Suicide attempt by adequate means ([address]    Alcohol abuse    Sleep arousal disorder    Essential hypertension    Acute renal impairment    Hyperlipidemia    Nicotine dependence, chewing tobacco, with withdrawal    Opioid dependence ([address]    Diverticulitis    Vit D Def    Plan:  1. Continue present medication(s)   2. Follow with Psych  3. He is medically stable for d/c. F/U with PCP in a few weeks, sooner for any changes or concerns. Discharge planning:    home     Reviewed treatment plans with the patient and/or family.              Electronically signed by Letty Jin MD on 11/17/2017 at 10:19 PM

## 2017-11-30 ENCOUNTER — HOSPITAL ENCOUNTER (OUTPATIENT)
Dept: PSYCHIATRY | Age: 57
Setting detail: THERAPIES SERIES
Discharge: HOME | End: 2017-11-30
Payer: COMMERCIAL

## 2017-11-30 DIAGNOSIS — F33.2 SEVERE EPISODE OF RECURRENT MAJOR DEPRESSIVE DISORDER, WITHOUT PSYCHOTIC FEATURES (HCC): Primary | ICD-10-CM

## 2017-12-21 NOTE — DISCHARGE SUMMARY
tablet  Commonly known as:  ZOCOR     WELLBUTRIN  MG extended release tablet  Generic drug:  buPROPion        STOP taking these medications    FLUoxetine 20 MG capsule  Commonly known as:  PROZAC           Where to Get Your Medications      You can get these medications from any pharmacy    Bring a paper prescription for each of these medications  · ergocalciferol 85676 units capsule  · naltrexone 50 MG tablet  · nicotine 21 MG/24HR  · traZODone 50 MG tablet       Discharge Instructions:  See AVS  Diet:  regular diet  Activity:  As tolerated. Follow up:[facility], [facility], Primary Care  Disposition:  home  Time Worked: [] Up to 30 minutes        [x] Greater than 30 minutes    Hospital Course    Zaira Templeton was admitted to [facility],  Adult     [facility] and acclimated to the unit. A comprehensive evaluation and treatment plan were completed and safety and comfort measures implememented. Home medications were reconciled and controlled substance prescriptions reviewed. Medical History and Physical Examination were completed by Dr. Samantha Sharif and associate. The following medication changes were made to address  Wellbutrin and Trazodone for depression and insomnia    Reason for more than one anti-psychotic N/A. Substance use withdrawal and treatment Naltrexone for alcohol and opioids. Tobacco use disorder and treatment nicotine patch and Wellbutrin. At time of discharge, patient was medically stable, cognitively intact without SI/HI psychotic symptoms. Performing ADLs without assistance, without abnormal movements, and with stable gait. It was felt by the treatment team that patient could be safely discharged to an outpatient level of care.         Electronically signed by Jr Rosen MD on 12/21/17 at 2:02 PM